# Patient Record
Sex: MALE | Race: WHITE | NOT HISPANIC OR LATINO | URBAN - METROPOLITAN AREA
[De-identification: names, ages, dates, MRNs, and addresses within clinical notes are randomized per-mention and may not be internally consistent; named-entity substitution may affect disease eponyms.]

---

## 2024-11-22 ENCOUNTER — APPOINTMENT (OUTPATIENT)
Dept: RADIOLOGY | Facility: HOSPITAL | Age: 64
End: 2024-11-22
Payer: OTHER GOVERNMENT

## 2024-11-22 ENCOUNTER — HOSPITAL ENCOUNTER (EMERGENCY)
Facility: HOSPITAL | Age: 64
Discharge: HOME | End: 2024-11-22
Attending: EMERGENCY MEDICINE
Payer: OTHER GOVERNMENT

## 2024-11-22 ENCOUNTER — APPOINTMENT (OUTPATIENT)
Dept: CARDIOLOGY | Facility: HOSPITAL | Age: 64
End: 2024-11-22
Payer: OTHER GOVERNMENT

## 2024-11-22 VITALS
HEIGHT: 70 IN | SYSTOLIC BLOOD PRESSURE: 134 MMHG | OXYGEN SATURATION: 99 % | RESPIRATION RATE: 18 BRPM | HEART RATE: 89 BPM | BODY MASS INDEX: 23.61 KG/M2 | TEMPERATURE: 98.2 F | WEIGHT: 164.9 LBS | DIASTOLIC BLOOD PRESSURE: 68 MMHG

## 2024-11-22 DIAGNOSIS — H61.23 BILATERAL IMPACTED CERUMEN: ICD-10-CM

## 2024-11-22 DIAGNOSIS — J01.90 ACUTE NON-RECURRENT SINUSITIS, UNSPECIFIED LOCATION: ICD-10-CM

## 2024-11-22 DIAGNOSIS — E83.42 HYPOMAGNESEMIA: ICD-10-CM

## 2024-11-22 DIAGNOSIS — R42 VERTIGO: Primary | ICD-10-CM

## 2024-11-22 LAB
ALBUMIN SERPL BCP-MCNC: 4.1 G/DL (ref 3.4–5)
ALP SERPL-CCNC: 69 U/L (ref 33–136)
ALT SERPL W P-5'-P-CCNC: 14 U/L (ref 10–52)
ANION GAP SERPL CALCULATED.3IONS-SCNC: 10 MMOL/L (ref 10–20)
APPEARANCE UR: CLEAR
AST SERPL W P-5'-P-CCNC: 14 U/L (ref 9–39)
B-OH-BUTYR SERPL-SCNC: 0.3 MMOL/L (ref 0.02–0.27)
BASE EXCESS BLDV CALC-SCNC: 0.3 MMOL/L (ref -2–3)
BASOPHILS # BLD AUTO: 0.07 X10*3/UL (ref 0–0.1)
BASOPHILS NFR BLD AUTO: 0.7 %
BILIRUB SERPL-MCNC: 0.4 MG/DL (ref 0–1.2)
BILIRUB UR STRIP.AUTO-MCNC: NEGATIVE MG/DL
BODY TEMPERATURE: 37 DEGREES CELSIUS
BUN SERPL-MCNC: 15 MG/DL (ref 6–23)
CALCIUM SERPL-MCNC: 8.9 MG/DL (ref 8.6–10.3)
CARDIAC TROPONIN I PNL SERPL HS: 4 NG/L (ref 0–20)
CARDIAC TROPONIN I PNL SERPL HS: 5 NG/L (ref 0–20)
CHLORIDE SERPL-SCNC: 110 MMOL/L (ref 98–107)
CO2 SERPL-SCNC: 25 MMOL/L (ref 21–32)
COLOR UR: COLORLESS
CREAT SERPL-MCNC: 0.87 MG/DL (ref 0.5–1.3)
EGFRCR SERPLBLD CKD-EPI 2021: >90 ML/MIN/1.73M*2
EOSINOPHIL # BLD AUTO: 0.35 X10*3/UL (ref 0–0.7)
EOSINOPHIL NFR BLD AUTO: 3.7 %
ERYTHROCYTE [DISTWIDTH] IN BLOOD BY AUTOMATED COUNT: 13.2 % (ref 11.5–14.5)
GLUCOSE SERPL-MCNC: 184 MG/DL (ref 74–99)
GLUCOSE UR STRIP.AUTO-MCNC: ABNORMAL MG/DL
HCO3 BLDV-SCNC: 26 MMOL/L (ref 22–26)
HCT VFR BLD AUTO: 40.9 % (ref 41–52)
HGB BLD-MCNC: 13.2 G/DL (ref 13.5–17.5)
IMM GRANULOCYTES # BLD AUTO: 0.04 X10*3/UL (ref 0–0.7)
IMM GRANULOCYTES NFR BLD AUTO: 0.4 % (ref 0–0.9)
INHALED O2 CONCENTRATION: 21 %
KETONES UR STRIP.AUTO-MCNC: NEGATIVE MG/DL
LEUKOCYTE ESTERASE UR QL STRIP.AUTO: NEGATIVE
LYMPHOCYTES # BLD AUTO: 2.01 X10*3/UL (ref 1.2–4.8)
LYMPHOCYTES NFR BLD AUTO: 21.2 %
MAGNESIUM SERPL-MCNC: 1.47 MG/DL (ref 1.6–2.4)
MCH RBC QN AUTO: 30.6 PG (ref 26–34)
MCHC RBC AUTO-ENTMCNC: 32.3 G/DL (ref 32–36)
MCV RBC AUTO: 95 FL (ref 80–100)
MONOCYTES # BLD AUTO: 0.46 X10*3/UL (ref 0.1–1)
MONOCYTES NFR BLD AUTO: 4.8 %
NEUTROPHILS # BLD AUTO: 6.56 X10*3/UL (ref 1.2–7.7)
NEUTROPHILS NFR BLD AUTO: 69.2 %
NITRITE UR QL STRIP.AUTO: NEGATIVE
NRBC BLD-RTO: 0 /100 WBCS (ref 0–0)
OXYHGB MFR BLDV: 42.3 % (ref 45–75)
PCO2 BLDV: 45 MM HG (ref 41–51)
PH BLDV: 7.37 PH (ref 7.33–7.43)
PH UR STRIP.AUTO: 6.5 [PH]
PLATELET # BLD AUTO: 224 X10*3/UL (ref 150–450)
PO2 BLDV: 27 MM HG (ref 35–45)
POTASSIUM SERPL-SCNC: 5.4 MMOL/L (ref 3.5–5.3)
PROT SERPL-MCNC: 6.9 G/DL (ref 6.4–8.2)
PROT UR STRIP.AUTO-MCNC: NEGATIVE MG/DL
RBC # BLD AUTO: 4.31 X10*6/UL (ref 4.5–5.9)
RBC # UR STRIP.AUTO: NEGATIVE /UL
SAO2 % BLDV: 45 % (ref 45–75)
SODIUM SERPL-SCNC: 140 MMOL/L (ref 136–145)
SP GR UR STRIP.AUTO: 1.02
UROBILINOGEN UR STRIP.AUTO-MCNC: NORMAL MG/DL
WBC # BLD AUTO: 9.5 X10*3/UL (ref 4.4–11.3)

## 2024-11-22 PROCEDURE — 84484 ASSAY OF TROPONIN QUANT: CPT | Performed by: CLINICAL NURSE SPECIALIST

## 2024-11-22 PROCEDURE — 83735 ASSAY OF MAGNESIUM: CPT | Performed by: CLINICAL NURSE SPECIALIST

## 2024-11-22 PROCEDURE — 70450 CT HEAD/BRAIN W/O DYE: CPT

## 2024-11-22 PROCEDURE — 93005 ELECTROCARDIOGRAM TRACING: CPT | Mod: 59

## 2024-11-22 PROCEDURE — 96365 THER/PROPH/DIAG IV INF INIT: CPT | Mod: 59

## 2024-11-22 PROCEDURE — 70450 CT HEAD/BRAIN W/O DYE: CPT | Performed by: RADIOLOGY

## 2024-11-22 PROCEDURE — 80053 COMPREHEN METABOLIC PANEL: CPT | Performed by: CLINICAL NURSE SPECIALIST

## 2024-11-22 PROCEDURE — 69210 REMOVE IMPACTED EAR WAX UNI: CPT | Mod: 50 | Performed by: CLINICAL NURSE SPECIALIST

## 2024-11-22 PROCEDURE — 96366 THER/PROPH/DIAG IV INF ADDON: CPT | Mod: 59

## 2024-11-22 PROCEDURE — 2500000004 HC RX 250 GENERAL PHARMACY W/ HCPCS (ALT 636 FOR OP/ED): Performed by: CLINICAL NURSE SPECIALIST

## 2024-11-22 PROCEDURE — 2500000002 HC RX 250 W HCPCS SELF ADMINISTERED DRUGS (ALT 637 FOR MEDICARE OP, ALT 636 FOR OP/ED): Performed by: CLINICAL NURSE SPECIALIST

## 2024-11-22 PROCEDURE — 99285 EMERGENCY DEPT VISIT HI MDM: CPT | Mod: 25

## 2024-11-22 PROCEDURE — 82810 BLOOD GASES O2 SAT ONLY: CPT | Mod: CCI | Performed by: CLINICAL NURSE SPECIALIST

## 2024-11-22 PROCEDURE — 2500000001 HC RX 250 WO HCPCS SELF ADMINISTERED DRUGS (ALT 637 FOR MEDICARE OP): Performed by: CLINICAL NURSE SPECIALIST

## 2024-11-22 PROCEDURE — 99291 CRITICAL CARE FIRST HOUR: CPT | Performed by: CLINICAL NURSE SPECIALIST

## 2024-11-22 PROCEDURE — 36415 COLL VENOUS BLD VENIPUNCTURE: CPT | Performed by: CLINICAL NURSE SPECIALIST

## 2024-11-22 PROCEDURE — 82010 KETONE BODYS QUAN: CPT | Performed by: CLINICAL NURSE SPECIALIST

## 2024-11-22 PROCEDURE — 81003 URINALYSIS AUTO W/O SCOPE: CPT | Performed by: CLINICAL NURSE SPECIALIST

## 2024-11-22 PROCEDURE — 85025 COMPLETE CBC W/AUTO DIFF WBC: CPT | Performed by: CLINICAL NURSE SPECIALIST

## 2024-11-22 RX ORDER — MECLIZINE HCL 12.5 MG 12.5 MG/1
25 TABLET ORAL 3 TIMES DAILY PRN
Qty: 15 TABLET | Refills: 0 | Status: SHIPPED | OUTPATIENT
Start: 2024-11-22 | End: 2024-11-27

## 2024-11-22 RX ORDER — AMOXICILLIN AND CLAVULANATE POTASSIUM 875; 125 MG/1; MG/1
875 TABLET, FILM COATED ORAL 2 TIMES DAILY
Qty: 14 TABLET | Refills: 0 | Status: SHIPPED | OUTPATIENT
Start: 2024-11-22 | End: 2024-11-22

## 2024-11-22 RX ORDER — MECLIZINE HYDROCHLORIDE 25 MG/1
25 TABLET ORAL ONCE
Status: COMPLETED | OUTPATIENT
Start: 2024-11-22 | End: 2024-11-22

## 2024-11-22 RX ORDER — MAGNESIUM SULFATE HEPTAHYDRATE 40 MG/ML
2 INJECTION, SOLUTION INTRAVENOUS ONCE
Status: COMPLETED | OUTPATIENT
Start: 2024-11-22 | End: 2024-11-22

## 2024-11-22 ASSESSMENT — PAIN - FUNCTIONAL ASSESSMENT: PAIN_FUNCTIONAL_ASSESSMENT: 0-10

## 2024-11-22 ASSESSMENT — COLUMBIA-SUICIDE SEVERITY RATING SCALE - C-SSRS
1. IN THE PAST MONTH, HAVE YOU WISHED YOU WERE DEAD OR WISHED YOU COULD GO TO SLEEP AND NOT WAKE UP?: NO
6. HAVE YOU EVER DONE ANYTHING, STARTED TO DO ANYTHING, OR PREPARED TO DO ANYTHING TO END YOUR LIFE?: NO
2. HAVE YOU ACTUALLY HAD ANY THOUGHTS OF KILLING YOURSELF?: NO

## 2024-11-22 ASSESSMENT — PAIN SCALES - GENERAL: PAINLEVEL_OUTOF10: 0 - NO PAIN

## 2024-11-22 NOTE — ED PROVIDER NOTES
Department of Emergency Medicine   ED  Provider Note  Admit Date/RoomTime: 11/22/2024 11:52 AM  ED Room: AC14/AC14        History of Present Illness:  Chief Complaint   Patient presents with    Dizziness     X1 week, intermitting.  Denies nausea vomiting. Denies chest pain, sob. Kaileeci kyler         Chino White is a 64 y.o. male history of anxiety, diabetes, hyperlipidemia, hypertension, 67 presents to the emergency department complaints of dizziness.  Presents to the emergency department complaints of dizziness.  Onset of symptoms about 1 week.  Patient reports when he turns his head sideways rolls over in bed or bends down to look at something he will have this episode of dizziness where he feels off balance.  He denies fall or injury.  He recently had sinus congestion with facial pain and pressure runny nose.  He is ears feel like they are full and popping.  Denies chest pain or shortness of breath no abdominal pain nausea vomiting or diarrhea.  Was seen evaluated in urgent care and referred to the emergency department for evaluation.  He denies any injury no difficulty moving his arms or legs    Review of Systems:   Pertinent positives and negatives are stated within HPI, all other systems reviewed and are negative.        --------------------------------------------- PAST HISTORY ---------------------------------------------  Past Medical History:  has no past medical history on file.  Past Surgical History:  has no past surgical history on file.  Social History:    Family History: family history is not on file.. Unless otherwise noted, family history is non contributory  The patient’s home medications have been reviewed.  Allergies: Other and Latex        ---------------------------------------------------PHYSICAL EXAM--------------------------------------    GENERAL APPEARANCE: Awake and alert.   VITAL SIGNS: As per the nurses' triage record.   HEENT: Normocephalic, atraumatic.  No contusions abrasions  "lacerations noted to the face or scalp extraocular muscles are intact. Pupils equal round and reactive to light. Conjunctiva are pink. Negative scleral icterus. Mucous membranes are moist. Tongue in the midline. Pharynx was without erythema or exudates, uvula midline.  Tympanic membranes are not visualized due to cerumen impaction  NECK: Soft Nontender and supple, full gross ROM, no meningeal signs.  CHEST: Nontender to palpation. Clear to auscultation bilaterally. No rales, rhonchi, or wheezing.   HEART: S1, S2. Regular rate and rhythm. No murmurs, gallops or rubs.  Strong and equal pulses in the extremities.   ABDOMEN: Soft, nontender, nondistended, positive bowel sounds, no palpable masses.  MUSCULCSKELETAL:  Full gross active range of motion. Ambulating on own with no acute difficulties  NEUROLOGICAL: Awake, alert and oriented x 3. Power intact in the upper and lower extremities. Sensation is intact to light touch in the upper and lower extremities.  Test of skew negative.  NIH 0 Van negative.  Pushes and pulls are equal and strong.  Sensation intact.  Follows commands.  Facial movement symmetrical pupils are 3 brisk reactive to light and accommodation.  IMMUNOLOGICAL: No lymphatic streaking noted   DERM: No petechiae, rashes, or ecchymoses.          ------------------------- NURSING NOTES AND VITALS REVIEWED ---------------------------  The nursing notes within the ED encounter and vital signs as below have been reviewed by myself  /68   Pulse 89   Temp 36.8 °C (98.2 °F) (Temporal)   Resp 18   Ht 1.778 m (5' 10\")   Wt 74.8 kg (164 lb 14.4 oz)   SpO2 99%   BMI 23.66 kg/m²     Oxygen Saturation Interpretation: 99% room air    The cardiac monitor revealed sinus rhythm with a heart rate in the 80s as interpreted by me. The cardiac monitor was ordered secondary to the patient's heart rate and to monitor the patient for dysrhythmia.       The patient’s available past medical records and past encounters " were reviewed.          -----------------------DIAGNOSTIC RESULTS------------------------  LABS:    Labs Reviewed   CBC WITH AUTO DIFFERENTIAL - Abnormal       Result Value    WBC 9.5      nRBC 0.0      RBC 4.31 (*)     Hemoglobin 13.2 (*)     Hematocrit 40.9 (*)     MCV 95      MCH 30.6      MCHC 32.3      RDW 13.2      Platelets 224      Neutrophils % 69.2      Immature Granulocytes %, Automated 0.4      Lymphocytes % 21.2      Monocytes % 4.8      Eosinophils % 3.7      Basophils % 0.7      Neutrophils Absolute 6.56      Immature Granulocytes Absolute, Automated 0.04      Lymphocytes Absolute 2.01      Monocytes Absolute 0.46      Eosinophils Absolute 0.35      Basophils Absolute 0.07     COMPREHENSIVE METABOLIC PANEL - Abnormal    Glucose 184 (*)     Sodium 140      Potassium 5.4 (*)     Chloride 110 (*)     Bicarbonate 25      Anion Gap 10      Urea Nitrogen 15      Creatinine 0.87      eGFR >90      Calcium 8.9      Albumin 4.1      Alkaline Phosphatase 69      Total Protein 6.9      AST 14      Bilirubin, Total 0.4      ALT 14     MAGNESIUM - Abnormal    Magnesium 1.47 (*)    BLOOD GAS VENOUS - Abnormal    POCT pH, Venous 7.37      POCT pCO2, Venous 45      POCT pO2, Venous 27 (*)     POCT SO2, Venous 45      POCT Oxy Hemoglobin, Venous 42.3 (*)     POCT Base Excess, Venous 0.3      POCT HCO3 Calculated, Venous 26.0      Patient Temperature 37.0      FiO2 21     BETA HYDROXYBUTYRATE - Abnormal    Beta-Hydroxybutyrate 0.30 (*)     Narrative:     The Beta-Hydroxybutyrate test performance characteristics have been validated by [facility]. This test has not been approved by the FDA; however, such approval is not necessary.   URINALYSIS WITH REFLEX CULTURE AND MICROSCOPIC - Abnormal    Color, Urine Colorless (*)     Appearance, Urine Clear      Specific Gravity, Urine 1.017      pH, Urine 6.5      Protein, Urine NEGATIVE      Glucose, Urine OVER (4+) (*)     Blood,  Urine NEGATIVE      Ketones, Urine NEGATIVE      Bilirubin, Urine NEGATIVE      Urobilinogen, Urine Normal      Nitrite, Urine NEGATIVE      Leukocyte Esterase, Urine NEGATIVE      Narrative:     OVER is reported when the result is greater than the clinically reportable range.   SERIAL TROPONIN-INITIAL - Normal    Troponin I, High Sensitivity 4      Narrative:     Less than 99th percentile of normal range cutoff-  Female and children under 18 years old <14 ng/L; Male <21 ng/L: Negative  Repeat testing should be performed if clinically indicated.     Female and children under 18 years old 14-50 ng/L; Male 21-50 ng/L:  Consistent with possible cardiac damage and possible increased clinical   risk. Serial measurements may help to assess extent of myocardial damage.     >50 ng/L: Consistent with cardiac damage, increased clinical risk and  myocardial infarction. Serial measurements may help assess extent of   myocardial damage.      NOTE: Children less than 1 year old may have higher baseline troponin   levels and results should be interpreted in conjunction with the overall   clinical context.     NOTE: Troponin I testing is performed using a different   testing methodology at [facility] than at other   [facility]. Direct result comparisons should only   be made within the same method.   SERIAL TROPONIN, 1 HOUR - Normal    Troponin I, High Sensitivity 5      Narrative:     Less than 99th percentile of normal range cutoff-  Female and children under 18 years old <14 ng/L; Male <21 ng/L: Negative  Repeat testing should be performed if clinically indicated.     Female and children under 18 years old 14-50 ng/L; Male 21-50 ng/L:  Consistent with possible cardiac damage and possible increased clinical   risk. Serial measurements may help to assess extent of myocardial damage.     >50 ng/L: Consistent with cardiac damage, increased clinical risk and  myocardial infarction. Serial measurements may help  assess extent of   myocardial damage.      NOTE: Children less than 1 year old may have higher baseline troponin   levels and results should be interpreted in conjunction with the overall   clinical context.     NOTE: Troponin I testing is performed using a different   testing methodology at [facility] than at other   [facility]. Direct result comparisons should only   be made within the same method.   TROPONIN SERIES- (INITIAL, 1 HR)    Narrative:     The following orders were created for panel order Troponin I Series, High Sensitivity (0, 1 HR).  Procedure                               Abnormality         Status                     ---------                               -----------         ------                     Troponin I, High Sensiti...[[id number]]  Normal              Final result               Troponin, High Sensitivi...[[id number]]  Normal              Final result                 Please view results for these tests on the individual orders.   URINALYSIS WITH REFLEX CULTURE AND MICROSCOPIC    Narrative:     The following orders were created for panel order Urinalysis with Reflex Culture and Microscopic.  Procedure                               Abnormality         Status                     ---------                               -----------         ------                     Urinalysis with Reflex C...[[id number]]  Abnormal            Final result               Extra Urine Gray Tube[[id number]]                                                         Please view results for these tests on the individual orders.   EXTRA URINE GRAY TUBE       As interpreted by me, the above displayed labs are abnormal. All other labs obtained during this visit were within normal range or not returned as of this dictation.      EKG Interpretation    1155 EKG Time:1151  EKG Interpretation time:1155  EKG Interpretation: EKG shows normal sinus rhythm with a rate of 90 bpm, normal axis, QTc normal at 401, no  evidence of STEMI.    EKG was interpreted by myself independently [JL]           CT head wo IV contrast   Final Result   No evidence of acute cortical infarct or intracranial hemorrhage.                  MACRO:   None             Signed by: Dakotah Li 11/22/2024 12:38 PM   Dictation workstation:   [id number]              CT head wo IV contrast   Final Result   No evidence of acute cortical infarct or intracranial hemorrhage.                  MACRO:   None             Signed by: Dakotah Li 11/22/2024 12:38 PM   Dictation workstation:   [id number]              ------------------------------ ED COURSE/MEDICAL DECISION MAKING----------------------  Medical Decision Making:   Exam: A medically appropriate exam performed, outlined above, given the known history and presentation.    History obtained from: Review of medical record nursing notes patient patient family      Social Determinants of Health considered during this visit: Takes care of himself at home      PAST MEDICAL HISTORY/Chronic Conditions Affecting Care     has no past medical history on file.       CC/HPI Summary, Social Determinants of health, Records Reviewed, DDx, testing done/not done, ED Course, Reassessment, disposition considerations/shared decision making with patient, consults, disposition:   Presents to the emergency department complaints of dizziness described as feeling off balance.  NIH 0  Test of skew negative  Van negative  Debrox  Ear irrigation  Magnesium sulfate  Antivert  CT head No evidence of acute cortical infarct or intracranial hemorrhage.   Chest x-ray patient refused  EKG  Beta-hydroxybutyrate  VBG  CBC  CMP next magnesium  Troponin  Urine    Medical Decision Making/Differential Diagnosis:  Differentials include but not limited to vertigo versus TIA versus stroke versus lab arthritis versus acute sinusitis versus arrhythmia versus electrolyte abnormality versus dehydration  Review  Beta-hydroxybutyrate 0.3  Magnesium  1.47  VBG 7.37 pH  Troponin 4 repeat troponin 5 no complaints of chest pain  Glucose 184  Potassium slightly high at 5.4 chloride high at 110 otherwise electrolytes unremarkable  Normal renal function  Normal LFTs  Urine showed glucose otherwise unremarkable  White blood cell count 9.5  Hemoglobin 13.2  Patient present to the emergency with complaints of dizziness described as feeling off balance with change in position and change in position of the head.  EKG per attending note showed normal sinus rhythm at a rate of 90 no ST elevation or arrhythmia noted.  No complaints of chest pain troponins are within normal limits.  Electrolytes reviewed potassium minimally high magnesium low replaced in the emergency department.  Chloride also slightly high.  No electrolyte imbalance noted.  Normal renal function normal LFTs no signs of active bleeding hemoglobin 13.2 no elevation white blood cell count indicate infection urine showed glucose.  Does not appear to be in DKA blood sugar 184 he is not hypoglycemic on clinical exam patient had cerumen impaction bilateral ears.  Patient was performed please see procedure note.  Initially was performed by nursing unable to remove the cerumen upon my evaluation ear irrigation performed by myself with large amount of cerumen from the left and small not removed from the right tympanic membranes were visualized pearly gray and intact no sign of otitis media otitis externa or mastoiditis.  Patient reported sinus congestion.  Advised Flonase over-the-counter supportive care measures at home.  Monitor for signs and symptoms of infections  Based on patient's clinic presentation history and symptoms consistent with cerumen impaction ear irrigation performed with resolution referral to ENT  Vertigo meclizine referral to vestibular rehab  Hypomagnesia IV magnesium patient denies any history of alcohol or drug use.  Reports he does have frequent diarrhea secondary to his metformin.  Patient  seen evaluated with attending physician. Dr. Parks    [facility] for discharge utilized for discharge planning no signs of sepsis or toxicity.  Patient reports improvement of symptoms after receiving the meclizine patient amenable plan amenable to discharge    PROCEDURES  Unless otherwise noted below, none    Ear Cerumen Removal    Performed by: JOLANTA Geiger-CNP  Authorized by: Carolyn Parks DO    Consent:     Consent obtained:  Verbal    Consent given by:  Patient    Risks, benefits, and alternatives were discussed: yes      Risks discussed:  Bleeding, infection, TM perforation, pain, incomplete removal and dizziness    Alternatives discussed:  Referral  [place] protocol:     Procedure explained and questions answered to patient or proxy's satisfaction: yes      Relevant documents present and verified: yes      Required blood products, implants, devices, and special equipment available: yes      Site/side marked: yes      Immediately prior to procedure, a time out was called: yes      Patient identity confirmed:  Verbally with patient and arm band  Procedure details:     Location:  L ear and R ear    Procedure type: curette      Procedure outcomes: cerumen removed    Post-procedure details:     Inspection:  Ear canal clear    Hearing quality:  Improved    Procedure completion:  Tolerated well, no immediate complications  Comments:      Ear irrigation was performed by nursing with no resolution of the cerumen in bilateral ears.  Upon my evaluation.  Debrox was applied by nursing.  Using 50% peroxide and 50% warm water irrigation was performed to the right ear small amount of cerumen was removed with resolution of the cerumen impaction tympanic membrane pearly gray and intact  Left tympanic membrane ear irrigation was performed with small amount of wax was removed irrigation.  Then using a curette a large clump of cerumen was removed.  Visualization of the tympanic membrane pearly gray and intact no sign of  otitis media otitis externa or mastoiditis.  Patient reports improvement of hearing.  Upon reevaluation no tympanic membrane rupture noted.  No bleeding in the ear contact no signs of infection.  Patient was given a prescription for Debrox.  Also referral to ENT    Critical Care    Performed by: JOLANTA Geiger-CNP  Authorized by: Carolyn Parks DO    Critical care provider statement:     Critical care time (minutes):  31    Critical care time was exclusive of:  Separately billable procedures and treating other patients and teaching time    Critical care was necessary to treat or prevent imminent or life-threatening deterioration of the following conditions:  Metabolic crisis    Critical care was time spent personally by me on the following activities:  Blood draw for specimens, development of treatment plan with patient or surrogate, discussions with primary provider, evaluation of patient's response to treatment, obtaining history from patient or surrogate, ordering and performing treatments and interventions, ordering and review of laboratory studies, ordering and review of radiographic studies, pulse oximetry, re-evaluation of patient's condition, review of old charts and interpretation of cardiac output measurements  Critical care time secondary to electrolyte abnormality requiring IV magnesium close monitoring  CONSULTS:   None      ED Course as of 11/22/24 1958 Fri Nov 22, 2024   1155 EKG Time:1151  EKG Interpretation time:1155  EKG Interpretation: EKG shows normal sinus rhythm with a rate of 90 bpm, normal axis, QTc normal at 401, no evidence of STEMI.    EKG was interpreted by myself independently [JL]      ED Course User Index  [JL] Chino Wiley DO         Diagnoses as of 11/22/24 1958   Vertigo   Bilateral impacted cerumen   Acute non-recurrent sinusitis, unspecified location   Hypomagnesemia         This patient has remained hemodynamically stable during their ED course.      Critical Care:  31        Counseling:  The emergency provider has spoken with the patient family and discussed today’s results, in addition to providing specific details for the plan of care and counseling regarding the diagnosis and prognosis.  Questions are answered at this time and they are agreeable with the plan.         --------------------------------- IMPRESSION AND DISPOSITION ---------------------------------    IMPRESSION  1. Vertigo    2. Bilateral impacted cerumen    3. Acute non-recurrent sinusitis, unspecified location    4. Hypomagnesemia        DISPOSITION  Disposition: Discharge home  Patient condition is stable improved        NOTE: This report was transcribed using voice recognition software. Every effort was made to ensure accuracy; however, inadvertent computerized transcription errors may be present      JOLANTA Geiger-CNP  11/22/24 1958

## 2024-11-22 NOTE — DISCHARGE INSTRUCTIONS
Flonase over-the-counter  Warm compresses to the base of the sinus congestion  Salt water gargles for throat pain and drainage  Ear irrigation was performed here in the emergency department.  Follow-up with the ENT for your symptoms of vertigo cerumen impaction  Change positions slowly.  No driving or operating machinery while dizziness is present until cleared by primary care physician    Return with any worsening symptoms or concerns  You may have increased dizziness secondary to the ear irrigation performed  Today it was noted that your magnesium level was low is importantly follow-up with your primary care physician to recheck your magnesium level

## 2024-11-22 NOTE — Clinical Note
Chino White was seen and treated in our emergency department on 11/22/2024.  He may return to work on 11/25/2024.  1-3 days if needed      If you have any questions or concerns, please don't hesitate to call.      Carolyn Parks, DO